# Patient Record
Sex: FEMALE | Race: BLACK OR AFRICAN AMERICAN | URBAN - METROPOLITAN AREA
[De-identification: names, ages, dates, MRNs, and addresses within clinical notes are randomized per-mention and may not be internally consistent; named-entity substitution may affect disease eponyms.]

---

## 2024-03-28 ENCOUNTER — OFFICE (OUTPATIENT)
Dept: URBAN - METROPOLITAN AREA CLINIC 11 | Facility: CLINIC | Age: 69
End: 2024-03-28

## 2024-03-28 VITALS
WEIGHT: 189 LBS | HEART RATE: 100 BPM | DIASTOLIC BLOOD PRESSURE: 68 MMHG | HEIGHT: 62 IN | OXYGEN SATURATION: 90 % | SYSTOLIC BLOOD PRESSURE: 126 MMHG

## 2024-03-28 DIAGNOSIS — E66.9 OBESITY, UNSPECIFIED: ICD-10-CM

## 2024-03-28 DIAGNOSIS — Z90.49 ACQUIRED ABSENCE OF OTHER SPECIFIED PARTS OF DIGESTIVE TRACT: ICD-10-CM

## 2024-03-28 DIAGNOSIS — K59.00 CONSTIPATION, UNSPECIFIED: ICD-10-CM

## 2024-03-28 DIAGNOSIS — R59.0 LOCALIZED ENLARGED LYMPH NODES: ICD-10-CM

## 2024-03-28 DIAGNOSIS — K42.9 UMBILICAL HERNIA WITHOUT OBSTRUCTION OR GANGRENE: ICD-10-CM

## 2024-03-28 PROCEDURE — 99204 OFFICE O/P NEW MOD 45 MIN: CPT | Performed by: STUDENT IN AN ORGANIZED HEALTH CARE EDUCATION/TRAINING PROGRAM

## 2024-03-28 NOTE — SERVICEHPINOTES
Ms. Vilma Shoemaker is a  68-year-old  female with past medical history of obesity, diabetes, hypertension, hyperlipidemia,  chronic constipation, who presents to gastro 1 as a referral for periumbilical hernias and nonspecific mesenteric lymphadenopathy recently found on CT scan.  
br
br Clinic visit 03/28/2024: 
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  She recently had a CT scan due to some abnormal masses on her abdomen that she was concerned about.  The CT scan shows to supraumbilical hernias containing fat and small mesenteric vessels with mild adjacent inflammatory stranding measuring up to 4 x 3 cm,  prominent small lymph nodes noted throughout the abdomen within the mesentery and retroperitoneum which is nonspecific.  She has a history of a cholecystectomy and uterine fibroids.  There is also a left lower lobe pulmonary nodule that is 5 mm and recommended follow-up was 12 months for that.  She was referred to me for further evaluation.  She has some chronic constipation for which she uses Amitiza twice a day for the last 6 years.  This helps her have a 1 bowel movement per day.  No definite blood in the stool or black tarry stool.  She is scheduled for colonoscopy with Dr. Vargas on Monday.  no family history of GI malignancies or liver disease.  She has no dysphagia or acid reflux.  She is on Ozempic for diabetes.  She has chronic constipation but no definite obstructive symptoms.  The "lumps" on her abdomen  did not hurt her all the time but they are slightly tender to the touch.

## 2024-03-28 NOTE — SERVICENOTES
Patient has some nonspecific lymphadenopathy on recent CT scan of undetermined clinical significance so I am going to refer her to [facility] to evaluate this to see if biopsy is needed.   In addition I am going to refer her to general surgery for evaluation for umbilical hernia repair since they appeared to be pretty large on the CT scan with some mesenteric vessel involvement.  Counseled the patient on weight loss.  Personally reviewed her outside medical records for her visit today.  All questions addressed.